# Patient Record
(demographics unavailable — no encounter records)

---

## 2024-10-15 NOTE — CONSULT LETTER
[Dear  ___] : Dear  [unfilled], [Consult Letter:] : I had the pleasure of evaluating your patient, [unfilled]. [Please see my note below.] : Please see my note below. [Consult Closing:] : Thank you very much for allowing me to participate in the care of this patient.  If you have any questions, please do not hesitate to contact me. [Sincerely,] : Sincerely, [FreeTextEntry3] : Laverne Damon D.O.  of Medicine Hematology/Oncology [facility]

## 2024-10-15 NOTE — ASSESSMENT
[FreeTextEntry1] : This is a 66 year old female who is here for an evaluation of leukopenia.  She has had a long standing history of a mildly low WBC count from as far back as 2020.  ?related to her history of hepatitis B in the past vs familial.   Her Hg/plt counts are normal.  Normal creatinine/calcium.  Labs sent revealed an abnormal immunoelectrophoresis- hypogammaglobulinemia.  Kappa FLC 32.4/lambda FLC 0.29, ratio 111.72.  Bone marrow biopsy 10/3/24- Plasma Cell Neoplasm/Myeloma- 30-40% involvement by  immunostain. - Normocellular marrow for age with mild erythroid predominance, maturing trilineage hematopoieisis, megakaryopoiesis, kappa restricted plasmacytosis, and no increase in immature cells/blasts. -  Normal FISH studies for multiple myeloma panel.  Plan for PET/CT to evaluate for any bone disease.   She makes criteria for high risk smolering myeloma but as her FLC ratio is nearing 100, she is truly multiple myleoma, standard risk.   Discussed that high risk SM have a short time to progression- risk of progression was 47 percent at 2 years, 82 percent at 5 years, and 97 percent at 10 years.  Will repeat her FLC ratio, if still the same, will likely plan on treatment with RVD.   She will follow up after the PET/CT scan.

## 2024-10-15 NOTE — HISTORY OF PRESENT ILLNESS
[de-identified] : This is a 66 year old female who is referred by Dr. Radha Mc for an evaluation of a leukopenia.   She has a history of ADHD, HSV, depression.  Told she had a history of acute hepatitis B in the late 80's?  was sick for 2-4 weeks but has since recovered.   Notes a history of 50 pound weight loss 6 years ago, works as a health and  now.   Recently moved from [place] 3 years ago.    Denies any tobacco use, social alcohol use.   7/10/24- WBC 3.26 Hg 12.5 Plt 137, .1 2/21/23- WBC 3.53, Hg 12.7 Plt 161 1/9/23- WBC 2.64 Hg 13.0 Plt 178 5/11/21- WBC 2.3 Hg 13.1 Plt 147, , ANC 1100 12/5/20- WBC 3.2 Hg 12.6 Plt 160, ANC 1700 11/13/20- WBC 2.4 Hg 13.1 Plt 174, AN 1100  Planned for endocrinology evaluation, cardiology evaluation/stress testing.  She feels well, no complaints.  No fever/chills, no night sweats, no weight loss.   [de-identified] : She is here to review her results.  No new complaints.

## 2024-11-04 NOTE — ASSESSMENT
[FreeTextEntry1] : cerumen impacted on left  tm cleared symptoms relieved prior audio w au mild symmetric loss f/u audio 8 mo

## 2024-11-04 NOTE — PHYSICAL EXAM
[Midline] : trachea located in midline position [Normal] : no rashes [de-identified] : cerumen impacted on tm

## 2024-11-04 NOTE — PHYSICAL EXAM
[No Acute Distress] : no acute distress [Well-Appearing] : well-appearing [Normal Sclera/Conjunctiva] : normal sclera/conjunctiva [No Respiratory Distress] : no respiratory distress  [Clear to Auscultation] : lungs were clear to auscultation bilaterally [Normal Rate] : normal rate  [Regular Rhythm] : with a regular rhythm [No Edema] : there was no peripheral edema [Soft] : abdomen soft [Non Tender] : non-tender [No Joint Swelling] : no joint swelling [No Rash] : no rash [Normal Gait] : normal gait [Normal Affect] : the affect was normal [de-identified] : Left era has impacte dwax

## 2024-11-04 NOTE — HISTORY OF PRESENT ILLNESS
[FreeTextEntry8] : 65 y/o F PMhx ADD, anxiety, depression, HLD, prediabetes, HLD, smoldering MM here for acute visit  Reports era issues x 3 weeks get better for 1 day and reappear again Was seen by ENT was told need a hearing aid, still in the process Symptoms left ear, no pain, no discharge, but has muffled feeling, sounds amplified in left ear upon touch Symptoms resolved for 1 days after a shower and then come back again Today symptoms not there

## 2024-11-04 NOTE — REVIEW OF SYSTEMS
[FreeTextEntry2] :  Denies headcahe, problem with vision, cp, sob, abdominal pain, problem with bowel and bladder [FreeTextEntry4] : ear issues

## 2024-11-04 NOTE — CONSULT LETTER
[Consult Letter:] : I had the pleasure of evaluating your patient, [unfilled]. [Please see my note below.] : Please see my note below. [Consult Closing:] : Thank you very much for allowing me to participate in the care of this patient.  If you have any questions, please do not hesitate to contact me. [Sincerely,] : Sincerely, [FreeTextEntry1] : Dear Dr. GABINO MERINO,  Thank you for your kind referral. Please refer to my enclosed office notes for DAO FELICIANO . If there are any questions free to contact me. [FreeTextEntry3] : Malik Wheat MD, FACS

## 2024-11-04 NOTE — REVIEW OF SYSTEMS
[Patient Intake Form Reviewed] : Patient intake form was reviewed [Negative] : Ear [de-identified] : clogged left ear, diminished hearing in left,

## 2024-11-05 NOTE — REASON FOR VISIT
[Follow-Up Visit] : a follow-up visit for [Home] : at home, [unfilled] , at the time of the visit. [Medical Office: ([facility])___] : at the medical office located in  [Verbal consent obtained from patient] : the patient, [unfilled] [FreeTextEntry2] : Leukopenia

## 2024-11-05 NOTE — ASSESSMENT
[FreeTextEntry1] : This is a 66 year old female who is here for an evaluation of leukopenia.  She has had a long standing history of a mildly low WBC count from as far back as 2020.  ?related to her history of hepatitis B in the past vs familial.   Her Hg/plt counts are normal.  Normal creatinine/calcium.  Labs sent revealed an abnormal immunoelectrophoresis- hypogammaglobulinemia.  Kappa FLC 32.4/lambda FLC 0.29, ratio 111.72.  Bone marrow biopsy 10/3/24- Plasma Cell Neoplasm/Myeloma- 30-40% involvement by  immunostain. - Normocellular marrow for age with mild erythroid predominance, maturing trilineage hematopoieisis, megakaryopoiesis, kappa restricted plasmacytosis, and no increase in immature cells/blasts. -  Normal FISH studies for multiple myeloma panel.  PET/CT 10/18/24- non avid pleural based RUL nodule.  No lytic lesions.   She makes criteria for high risk smolering myeloma but as her FLC ratio is nearing 100, she does meet criteria for multiple myleoma, standard risk.    Patients with otherwise asymptomatic myeloma who have an involved/uninvolved FLC ratio of 100 have a risk of progression to end-organ damage in the next two years that has been reported to be as high as 70 to 80 percent. In these patients, if the absolute involved FLC level was also 100 mg/dL, the risk of progression in the next two years increased to 93 percent. Given the high rate of progression, an FLC ratio 100 is considered diagnostic of MM   Recommend treatment RVD- revlimid 25mg po daily days 21/28 days, weekly velcade 1.3mg/m2- 3 weeks on/1 week off, decadron 20mg weekly.  Risks including but not limited to myelosuppression, infection, GI side effects, peripheral neuropathy, zoster reactivation, thrombosis.   Acyclovir prophylaxis.  Aspirin prophylaxis.  She will discuss this further with her .  Plan on signing REMS form.  Concent next week, begin treatment the week after.

## 2024-11-05 NOTE — CONSULT LETTER
[Dear  ___] : Dear  [unfilled], [Consult Letter:] : I had the pleasure of evaluating your patient, [unfilled]. [Please see my note below.] : Please see my note below. [Consult Closing:] : Thank you very much for allowing me to participate in the care of this patient.  If you have any questions, please do not hesitate to contact me. [Sincerely,] : Sincerely, [FreeTextEntry3] : Laverne Damon D.O.  of Medicine Hematology/Oncology [facility]

## 2024-11-05 NOTE — CONSULT LETTER
[Dear  ___] : Dear  [unfilled], [Consult Letter:] : I had the pleasure of evaluating your patient, [unfilled]. [Please see my note below.] : Please see my note below. [Consult Closing:] : Thank you very much for allowing me to participate in the care of this patient.  If you have any questions, please do not hesitate to contact me. [Sincerely,] : Sincerely, [FreeTextEntry3] : Laverne Damon D.O.  of Medicine Hematology/Oncology [facility]

## 2024-11-05 NOTE — REASON FOR VISIT
[Follow-Up Visit] : a follow-up visit for [Home] : at home, [unfilled] , at the time of the visit. [Medical Office: ([facility])___] : at the medical office located in  [Verbal consent obtained from patient] : the patient, [unfilled] [FreeTextEntry2] : Leukopenia

## 2024-11-05 NOTE — HISTORY OF PRESENT ILLNESS
[de-identified] : This is a 66 year old female who is referred by Dr. Radha Mc for an evaluation of a leukopenia.   She has a history of ADHD, HSV, depression.  Told she had a history of acute hepatitis B in the late 80's?  was sick for 2-4 weeks but has since recovered.   Notes a history of 50 pound weight loss 6 years ago, works as a health and  now.   Recently moved from [place] 3 years ago.    Denies any tobacco use, social alcohol use.   7/10/24- WBC 3.26 Hg 12.5 Plt 137, .1 2/21/23- WBC 3.53, Hg 12.7 Plt 161 1/9/23- WBC 2.64 Hg 13.0 Plt 178 5/11/21- WBC 2.3 Hg 13.1 Plt 147, , ANC 1100 12/5/20- WBC 3.2 Hg 12.6 Plt 160, ANC 1700 11/13/20- WBC 2.4 Hg 13.1 Plt 174, AN 1100  Planned for endocrinology evaluation, cardiology evaluation/stress testing.  She feels well, no complaints.  No fever/chills, no night sweats, no weight loss.   [de-identified] : Her  is present for the phone visit.

## 2024-11-06 NOTE — HISTORY OF PRESENT ILLNESS
[de-identified] : This is a 66 year old female who is referred by Dr. Radha Mc for an evaluation of a leukopenia.   She has a history of ADHD, HSV, depression.  Told she had a history of acute hepatitis B in the late 80's?  was sick for 2-4 weeks but has since recovered.   Notes a history of 50 pound weight loss 6 years ago, works as a health and  now.   Recently moved from [place] 3 years ago.    Denies any tobacco use, social alcohol use.   7/10/24- WBC 3.26 Hg 12.5 Plt 137, .1 2/21/23- WBC 3.53, Hg 12.7 Plt 161 1/9/23- WBC 2.64 Hg 13.0 Plt 178 5/11/21- WBC 2.3 Hg 13.1 Plt 147, , ANC 1100 12/5/20- WBC 3.2 Hg 12.6 Plt 160, ANC 1700 11/13/20- WBC 2.4 Hg 13.1 Plt 174, AN 1100  Planned for endocrinology evaluation, cardiology evaluation/stress testing.  She feels well, no complaints.  No fever/chills, no night sweats, no weight loss.   [de-identified] : She is here to discuss treatment options further, her  is present by phone.  Stressed about her diagnosis, otherwise no physical symptoms.

## 2024-11-06 NOTE — CONSULT LETTER
[Dear  ___] : Dear  [unfilled], [Consult Letter:] : I had the pleasure of evaluating your patient, [unfilled]. [Please see my note below.] : Please see my note below. [Consult Closing:] : Thank you very much for allowing me to participate in the care of this patient.  If you have any questions, please do not hesitate to contact me. [Sincerely,] : Sincerely, [FreeTextEntry3] : Laverne Damon D.O.  of Medicine Hematology/Oncology [facility]

## 2024-11-15 NOTE — HISTORY OF PRESENT ILLNESS
[FreeTextEntry1] : Ms. Bolivar is presenting for Osteoporosis.  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   66 year old female with ADD, smoldering multiple myeloma soon to start chemo with decadron 20mg every week of chemo (every 3 weeks on, 1 week off). Pt is not inclined to start medication for OP.   #Osteoporosis Home meds:  Fracture history: Fractures - left ulna at the age of 12 years, left tibia in 2015 when she fell into a hole Family history: No parental history of hip fracture Prior Treatment: None  Prior HRT: No Allergies: NKDA   Bone History Menopause: Early to mid 50s DEXA scan July 2024: T-scores of -2.2 at the lumbar spine, -2.5 at the femoral neck.   Falls: No Height loss:  Kidney stones: No Dental health: Due for a regular appointments. H/o bone graft ~7 years ago.  Exercise: Walking  Dairy intake: 1 cup of almond milk per day, 1 cup of cottage cheese three times per week, eats one slice of cheese per week, patient sporadically eats ice-cream Calcium supplements: Osteo sanya  Multivitamin: No  Vitamin D supplements: 1600 IU Vit D   Osteoporosis risk factors include: Postmenopausal status,  race, prior fracture, falls, height loss, small thin bones, tobacco use, malignancy, radiation treatment, excessive alcohol, anorexia, family history, vitamin D deficiency, long term corticosteroid use (>3 months), seizure medications (ie phenytoin), prolonged amenorrhea, eating disorders, malabsorption, hyperparathyroidism, hyperthyroidism. NEGATIVE EXCEPT: Postmenopausal status,  race, pending steroid use with chemo.

## 2024-11-15 NOTE — ASSESSMENT
[Denosumab Therapy] : Risks  and benefits of denosumab therapy were discussed with the patient including eczema, cellulitis, osteonecrosis of the jaw and atypical femur fractures [Bisphosphonate Therapy] : Risks and benefits of bisphosphonate therapy were  discussed with the patient including gastroesophageal irritation, osteonecrosis of the jaw, and atypical femur fractures, and acute phase reaction [FreeTextEntry1] : 66 year old female with ADD, smoldering multiple myeloma soon to start chemo with decadron 20mg every week of chemo (every 3 weeks on, 1 week off) is presenting for Osteoporosis.   1. Osteoporosis.  -She has no history of fragility fracture.  -We discussed the potential benefits and risks of the osteoanabolic and antiresorptive classes of pharmacologic osteoporosis therapy. We discussed the potential benefits and risks of antiresorptive osteoporosis therapy with oral bisphosphonates, denosumab or zoledronic acid at length, including but not limited to osteonecrosis of the jaw and atypical femoral fracture. We discussed that denosumab must be dosed every 6 months due to rebound increase in bone breakdown with abrupt discontinuation of therapy, with transition to bisphosphonate therapy prior to a "drug holiday."   I still recommend therapy with oral bisphosphonates vs reclast given that she will be starting decadron 20mg the week of chemo (to occur every 3 weeks, with 1 week off). We discussed that this will put her at severe risk of worsening OP and fracure.  She will consider her options pending evaluation below.  Metabolic evaluation for secondary causes of osteoporosis Calcium 1000 mg daily from diet and supplements (to be taken in divided doses as no more than 500-600 mg can be absorbed at one time); advised increased dietary and/or supplemental calcium Can continue vitamin D supplementation to 3000 intl units daily Diet, weight bearing exercises and fall prevention discussed  Patient verbalized understanding of the above. All questions were answered to patient's satisfaction. Dispo: Patient will follow up in 3 weeks TTM to go over above results.

## 2024-11-15 NOTE — PHYSICAL EXAM
[Alert] : alert [Well Nourished] : well nourished [EOMI] : extra ocular movement intact [Normal Hearing] : hearing was normal [No Respiratory Distress] : no respiratory distress [Normal Rate] : heart rate was normal [Spine Straight] : spine straight [Normal Gait] : normal gait [No Tremors] : no tremors [Oriented x3] : oriented to person, place, and time [Normal Affect] : the affect was normal [Normal Mood] : the mood was normal

## 2025-07-14 NOTE — HEALTH RISK ASSESSMENT
[Yes] : Yes [1 or 2 (0 pts)] : 1 or 2 (0 points) [Never (0 pts)] : Never (0 points) [No falls in past year] : Patient reported no falls in the past year [0] : 2) Feeling down, depressed, or hopeless: Not at all (0) [PHQ-2 Negative - No further assessment needed] : PHQ-2 Negative - No further assessment needed [No] : does not take [20 or more] : 20 or more [< 15 Years] : < 15 Years [NO] : No [None] : None [With Family] : lives with family [] :  [Fully functional (bathing, dressing, toileting, transferring, walking, feeding)] : Fully functional (bathing, dressing, toileting, transferring, walking, feeding) [Fully functional (using the telephone, shopping, preparing meals, housekeeping, doing laundry, using] : Fully functional and needs no help or supervision to perform IADLs (using the telephone, shopping, preparing meals, housekeeping, doing laundry, using transportation, managing medications and managing finances) [Smoke Detector] : smoke detector [Carbon Monoxide Detector] : carbon monoxide detector [Seat Belt] :  uses seat belt [Sunscreen] : uses sunscreen [With Patient/Caregiver] : , with patient/caregiver [de-identified] : reg [de-identified] : reg [[id number]] : 0 [EyeExamDate] : 1/1/25 [Change in mental status noted] : No change in mental status noted [Reports changes in hearing] : Reports no changes in hearing [MammogramDate] : 7/16/24 [MammogramComments] : birad 2 [PapSmearDate] : 3/24/23 [BoneDensityDate] : 7/9/24 [BoneDensityComments] : osteporosis [ColonoscopyDate] : 12/1/2020 [AdvancecareDate] : 7/14/25

## 2025-07-14 NOTE — ASSESSMENT
[FreeTextEntry1] : Appropriate labs were drawn in this office today. advised shingrx. All preventative measures were reviewed with the patient and the patient is due for and agrees to the following as outlined  in the plan  below.  [Vaccines Reviewed] : Immunizations reviewed today. Please see immunization details in the vaccine log within the immunization flowsheet.

## 2025-07-29 NOTE — HISTORY OF PRESENT ILLNESS
[FreeTextEntry1] : Patient is a 68 yo F here to discuss colon cancer screening.   - Last colonoscopy:2020 in [place] (1st colonoscopy that was reportedly normal and reports she thinks she was told to have repeat in 10 years - but is not sure. no report for review) - Family history of CRC: none - Symptoms of altered bowel habits or bleeding: none - Past medical history: smoldering MM being watched at this time off medication (follows w/ [facility]), osteoporosis, ADD depression - Use of blood thinners: none - Use of diabetic medications: none - Use of weight loss medications: none - Prior abdominal surgeries: none  July 2025 - Hep C Ab (Unremarkable), normal LFTs August 2024 - Hep B PCR, HIV Ab, Hep B surface Ag, Hep B core IgM, Hep A Ab IgM (Unremarkable) July 2024 - Hep B PCR, HCV Ab, Hep B core IgM, Hep B surface Ag (Unremarkable) REACTIVE - Hep B surface Ab, Hep B core Ab total

## 2025-07-29 NOTE — ASSESSMENT
[FreeTextEntry1] : Patient is a 66 yo F here to discuss colon cancer screening.  #Colon cancer screening - Patient to sign release form today to obtain prior colonoscopy report and path - After review of this will determine timing of repeat colonoscopy  #Past hepatitis B infection - Serologies noted from past with recent normal LFTs in July - Patient gets regular bloodwork with [facility] for her MM. Asked to inform us if any changes in LFTs  Patient asked to follow-up with the office in 4 weeks to ensure we have received her prior records

## 2025-07-29 NOTE — PHYSICAL EXAM
[Alert] : alert [Normal Voice/Communication] : normal voice/communication [Healthy Appearing] : healthy appearing [No Acute Distress] : no acute distress [Sclera] : the sclera and conjunctiva were normal [Hearing Threshold Finger Rub Not [place]] : hearing was normal [Normal Lips/Gums] : the lips and gums were normal [Normal Appearance] : the appearance of the neck was normal [No Respiratory Distress] : no respiratory distress [No Acc Muscle Use] : no accessory muscle use [Abdomen Tenderness] : non-tender [Abdomen Soft] : soft [Oriented To Time, Place, And Person] : oriented to person, place, and time